# Patient Record
Sex: FEMALE | Race: WHITE | Employment: UNEMPLOYED | URBAN - METROPOLITAN AREA
[De-identification: names, ages, dates, MRNs, and addresses within clinical notes are randomized per-mention and may not be internally consistent; named-entity substitution may affect disease eponyms.]

---

## 2017-01-09 ENCOUNTER — OFFICE VISIT (OUTPATIENT)
Dept: SURGERY | Facility: CLINIC | Age: 62
End: 2017-01-09

## 2017-01-09 VITALS
DIASTOLIC BLOOD PRESSURE: 87 MMHG | WEIGHT: 218 LBS | BODY MASS INDEX: 31.21 KG/M2 | HEIGHT: 70 IN | SYSTOLIC BLOOD PRESSURE: 128 MMHG | HEART RATE: 93 BPM | RESPIRATION RATE: 18 BRPM

## 2017-01-09 DIAGNOSIS — N61.0 CELLULITIS OF RIGHT BREAST: ICD-10-CM

## 2017-01-09 DIAGNOSIS — N60.91 ATYPICAL LOBULAR HYPERPLASIA OF RIGHT BREAST: Primary | ICD-10-CM

## 2017-01-09 DIAGNOSIS — D24.1 FIBROADENOMA OF RIGHT BREAST IN FEMALE: ICD-10-CM

## 2017-01-09 PROCEDURE — 99213 OFFICE O/P EST LOW 20 MIN: CPT | Performed by: SURGERY

## 2017-01-09 NOTE — PROGRESS NOTES
Follow Up Visit Note       Active Problems      1. Atypical lobular hyperplasia of right breast    2. 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  Cellulitis of right breast          Chief Complaint   Breast Problem    History of Present Illness  [place] Social History    Marital Status:              Spouse Name:                       Years of Education:                 Number of children:               Social History Main Topics    Smoking Status: Never Smoker                      Smokeless Sta time. She appears well-developed and well-nourished. No distress. HENT:   Head: Normocephalic and atraumatic. Eyes: Conjunctivae and EOM are normal. Pupils are equal, round, and reactive to light. No scleral icterus.    Neck: Trachea normal and full pas

## 2017-05-23 ENCOUNTER — TELEPHONE (OUTPATIENT)
Dept: SURGERY | Facility: CLINIC | Age: 62
End: 2017-05-23

## 2017-05-31 ENCOUNTER — MED REC SCAN ONLY (OUTPATIENT)
Dept: OBGYN CLINIC | Facility: CLINIC | Age: 62
End: 2017-05-31

## 2017-06-28 ENCOUNTER — OFFICE VISIT (OUTPATIENT)
Dept: SURGERY | Facility: CLINIC | Age: 62
End: 2017-06-28

## 2017-06-28 VITALS
WEIGHT: 210 LBS | TEMPERATURE: 97 F | HEIGHT: 70 IN | DIASTOLIC BLOOD PRESSURE: 81 MMHG | HEART RATE: 80 BPM | BODY MASS INDEX: 30.06 KG/M2 | SYSTOLIC BLOOD PRESSURE: 128 MMHG

## 2017-06-28 DIAGNOSIS — D24.1 FIBROADENOMA OF RIGHT BREAST IN FEMALE: ICD-10-CM

## 2017-06-28 DIAGNOSIS — N60.91 ATYPICAL LOBULAR HYPERPLASIA OF RIGHT BREAST: Primary | ICD-10-CM

## 2017-06-28 PROCEDURE — 99212 OFFICE O/P EST SF 10 MIN: CPT | Performed by: SURGERY

## 2017-06-28 NOTE — PROGRESS NOTES
Follow Up Visit Note       Active Problems      1. Atypical lobular hyperplasia of right breast    2.  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  Disp:  Rfl:         Review of Systems tenderness. Left breast exhibits no inverted nipple, no mass, no nipple discharge, no skin change and no tenderness. Right breast incisions healed well. Lymphadenopathy:        Right axillary: No pectoral and no lateral adenopathy present.         L

## 2018-03-28 ENCOUNTER — OFFICE VISIT (OUTPATIENT)
Dept: OBGYN CLINIC | Facility: CLINIC | Age: 63
End: 2018-03-28

## 2018-03-28 VITALS
BODY MASS INDEX: 18.65 KG/M2 | RESPIRATION RATE: 18 BRPM | SYSTOLIC BLOOD PRESSURE: 120 MMHG | DIASTOLIC BLOOD PRESSURE: 86 MMHG | HEIGHT: 70.25 IN | HEART RATE: 89 BPM | WEIGHT: 130.25 LBS

## 2018-03-28 DIAGNOSIS — Z12.4 SCREENING FOR MALIGNANT NEOPLASM OF CERVIX: ICD-10-CM

## 2018-03-28 DIAGNOSIS — Z01.419 WELL WOMAN EXAM WITH ROUTINE GYNECOLOGICAL EXAM: Primary | ICD-10-CM

## 2018-03-28 DIAGNOSIS — Z91.89 AT RISK FOR OSTEOPOROSIS: ICD-10-CM

## 2018-03-28 PROCEDURE — 88175 CYTOPATH C/V AUTO FLUID REDO: CPT | Performed by: OBSTETRICS & GYNECOLOGY

## 2018-03-28 PROCEDURE — 99396 PREV VISIT EST AGE 40-64: CPT | Performed by: OBSTETRICS & GYNECOLOGY

## 2018-03-28 PROCEDURE — 87624 HPV HI-RISK TYP POOLED RSLT: CPT | Performed by: OBSTETRICS & GYNECOLOGY

## 2018-03-28 NOTE — PROGRESS NOTES
Jeppie Fabry is a 66year old female No obstetric history on file. No LMP recorded. Patient is not currently having periods (Reason: Menopause). Patient presents with:  Physical: wwe  . She has no complaints.   Denies postmenopausal bleeding, urinary o Disp: 90 tablet, Rfl: 0    ALLERGIES:    Clindamycin Hcl         Hives  Pcns [Penicillins]      Hives  Sulfa Antibiotics       Hives      Review of Systems:  Constitutional:  Denies fatigue, night sweats, hot flashed  Gastrointestinal:  denies heartburn, a Future        Pap with HPV done. Annual exams encouraged. Mammogram ordered. Call if any vaginal bleeding. Encouraged 1200 mg calcium w/ vit D. Encouraged weight bearing exercise. Follow-up in one year.       No prescriptions requested or ordered in this

## 2018-03-29 LAB — HPV I/H RISK 1 DNA SPEC QL NAA+PROBE: NEGATIVE

## 2018-03-30 LAB — LAST PAP RESULT: NORMAL

## 2018-08-21 ENCOUNTER — TELEPHONE (OUTPATIENT)
Dept: SURGERY | Facility: CLINIC | Age: 63
End: 2018-08-21

## 2018-08-21 DIAGNOSIS — N60.91 ATYPICAL LOBULAR HYPERPLASIA OF RIGHT BREAST: Primary | ICD-10-CM

## 2018-08-21 NOTE — TELEPHONE ENCOUNTER
Pt calling states needs order for mammogram faxed to [facility] advanced imaging. No current order. Advised will speak with citlaly for order then fax it over. Pt requesting call once completed.   [phone]  Fax- [phone]

## 2018-08-31 ENCOUNTER — TELEPHONE (OUTPATIENT)
Dept: SURGERY | Facility: CLINIC | Age: 63
End: 2018-08-31

## 2018-08-31 NOTE — TELEPHONE ENCOUNTER
Received mammogram from [facility] pt to schedule breast exam/ review [facility]- [facility]  Made copy and placed in scanning. Results placed on [employer] desk for review.

## 2018-09-05 ENCOUNTER — TELEPHONE (OUTPATIENT)
Dept: SURGERY | Facility: CLINIC | Age: 63
End: 2018-09-05

## 2018-09-05 NOTE — TELEPHONE ENCOUNTER
Left a voice message asking Geoffrey Olivares to call and make a follow up appointment with DR Starla Faust after her mammogram.

## 2019-05-28 ENCOUNTER — OFFICE VISIT (OUTPATIENT)
Dept: OBGYN CLINIC | Facility: CLINIC | Age: 64
End: 2019-05-28
Payer: COMMERCIAL

## 2019-05-28 VITALS
SYSTOLIC BLOOD PRESSURE: 112 MMHG | DIASTOLIC BLOOD PRESSURE: 64 MMHG | BODY MASS INDEX: 31.35 KG/M2 | HEIGHT: 70 IN | WEIGHT: 219 LBS | HEART RATE: 89 BPM

## 2019-05-28 DIAGNOSIS — Z12.39 SCREENING FOR MALIGNANT NEOPLASM OF BREAST: Primary | ICD-10-CM

## 2019-05-28 PROCEDURE — 99396 PREV VISIT EST AGE 40-64: CPT | Performed by: OBSTETRICS & GYNECOLOGY

## 2019-05-28 NOTE — PROGRESS NOTES
Zita Hubbard is a 60year old female  No LMP recorded. (Menstrual status: Menopause). Patient presents with:  Wellness Visit  . Her mom had osteoporosis. Vitamin D was discussed with her.   She had a DEXA in the past.  Has not had a colonosc Drug use: No      Sexual activity: Yes    Lifestyle      Physical activity:        Days per week: Not on file        Minutes per session: Not on file      Stress: Not on file    Relationships      Social connections:        Talks on phone: Not on file itching  Skin/Breast:  Denies any breast pain, lumps, or discharge. Neurological:  denies headaches, extremity weakness or numbness.       PHYSICAL EXAM:   Constitutional: well developed, well nourished  Head/Face: normocephalic  Neck/Thyroid: thyroid sym

## 2019-11-25 ENCOUNTER — TELEPHONE (OUTPATIENT)
Dept: OBGYN CLINIC | Facility: CLINIC | Age: 64
End: 2019-11-25

## 2019-11-25 NOTE — TELEPHONE ENCOUNTER
Spoke with patient and informed her I faxed the order for her mammogram as requested to [facility].  She would like a new order for a diagnostic mammogram instead of screening because she will be in the area from [place] [facility]

## 2019-12-23 ENCOUNTER — HOSPITAL ENCOUNTER (OUTPATIENT)
Dept: MAMMOGRAPHY | Age: 64
Discharge: HOME | End: 2019-12-23
Attending: OBSTETRICS & GYNECOLOGY
Payer: COMMERCIAL

## 2019-12-23 DIAGNOSIS — Z12.39 SCREENING FOR MALIGNANT NEOPLASM OF BREAST: ICD-10-CM

## 2019-12-23 PROCEDURE — 77067 SCR MAMMO BI INCL CAD: CPT | Performed by: OBSTETRICS & GYNECOLOGY

## 2019-12-23 PROCEDURE — 77063 BREAST TOMOSYNTHESIS BI: CPT | Performed by: OBSTETRICS & GYNECOLOGY

## 2025-01-10 NOTE — TELEPHONE ENCOUNTER
Patient would like order for Diagnostic mammo faxed to [facility] @ [phone] None needed

## (undated) NOTE — Clinical Note
I had the pleasure of seeing Johan Santillan on 6/28/2017. Please see my attached note.   Rebecca Chaparro MD FACS [facility]

## (undated) NOTE — Clinical Note
I had the pleasure of seeing Bam Julio on 1/9/2017. Please see my attached note.   Gisella Dick MD FACS [facility]